# Patient Record
Sex: FEMALE | Race: WHITE | NOT HISPANIC OR LATINO | URBAN - METROPOLITAN AREA
[De-identification: names, ages, dates, MRNs, and addresses within clinical notes are randomized per-mention and may not be internally consistent; named-entity substitution may affect disease eponyms.]

---

## 2018-04-23 ENCOUNTER — APPOINTMENT (OUTPATIENT)
Dept: URBAN - METROPOLITAN AREA CLINIC 219 | Age: 68
Setting detail: DERMATOLOGY
End: 2018-04-23

## 2018-04-23 DIAGNOSIS — L81.4 OTHER MELANIN HYPERPIGMENTATION: ICD-10-CM

## 2018-04-23 DIAGNOSIS — L82.0 INFLAMED SEBORRHEIC KERATOSIS: ICD-10-CM

## 2018-04-23 DIAGNOSIS — L82.1 OTHER SEBORRHEIC KERATOSIS: ICD-10-CM

## 2018-04-23 DIAGNOSIS — L57.0 ACTINIC KERATOSIS: ICD-10-CM

## 2018-04-23 PROBLEM — J30.1 ALLERGIC RHINITIS DUE TO POLLEN: Status: ACTIVE | Noted: 2018-04-23

## 2018-04-23 PROCEDURE — OTHER REASSURANCE: OTHER

## 2018-04-23 PROCEDURE — 17000 DESTRUCT PREMALG LESION: CPT | Mod: 59

## 2018-04-23 PROCEDURE — 99213 OFFICE O/P EST LOW 20 MIN: CPT | Mod: 25

## 2018-04-23 PROCEDURE — 17110 DESTRUCT B9 LESION 1-14: CPT

## 2018-04-23 PROCEDURE — OTHER COUNSELING: OTHER

## 2018-04-23 PROCEDURE — 17003 DESTRUCT PREMALG LES 2-14: CPT

## 2018-04-23 PROCEDURE — OTHER LIQUID NITROGEN: OTHER

## 2018-04-23 ASSESSMENT — LOCATION DETAILED DESCRIPTION DERM
LOCATION DETAILED: LEFT INFERIOR UPPER BACK
LOCATION DETAILED: SUPERIOR THORACIC SPINE
LOCATION DETAILED: LEFT SUPERIOR LATERAL UPPER BACK
LOCATION DETAILED: LEFT INFERIOR MEDIAL MALAR CHEEK
LOCATION DETAILED: LEFT SUPERIOR CENTRAL MALAR CHEEK
LOCATION DETAILED: LEFT ANTERIOR DISTAL UPPER ARM
LOCATION DETAILED: LEFT MEDIAL SUPERIOR CHEST
LOCATION DETAILED: RIGHT CENTRAL MALAR CHEEK
LOCATION DETAILED: LEFT ANTERIOR SHOULDER
LOCATION DETAILED: LEFT SUPERIOR MEDIAL MALAR CHEEK
LOCATION DETAILED: LEFT MID-UPPER BACK

## 2018-04-23 ASSESSMENT — LOCATION ZONE DERM
LOCATION ZONE: FACE
LOCATION ZONE: ARM
LOCATION ZONE: TRUNK

## 2018-04-23 ASSESSMENT — LOCATION SIMPLE DESCRIPTION DERM
LOCATION SIMPLE: LEFT CHEEK
LOCATION SIMPLE: LEFT SHOULDER
LOCATION SIMPLE: RIGHT CHEEK
LOCATION SIMPLE: LEFT UPPER BACK
LOCATION SIMPLE: CHEST
LOCATION SIMPLE: UPPER BACK
LOCATION SIMPLE: LEFT UPPER ARM

## 2018-04-23 NOTE — PROCEDURE: LIQUID NITROGEN
Add 52 Modifier (Optional): no
Detail Level: Detailed
Post-Care Instructions: I reviewed with the patient in detail post-care instructions. Patient is to wear sunprotection, and avoid picking at any of the treated lesions. Pt may apply Vaseline to crusted or scabbing areas.
Duration Of Freeze Thaw-Cycle (Seconds): 0
Number Of Freeze-Thaw Cycles: 1 freeze-thaw cycle
Medical Necessity Clause: This procedure was medically necessary because the lesions that were treated were:
Consent: The patient's consent was obtained including but not limited to risks of crusting, scabbing, blistering, scarring, darker or lighter pigmentary change, recurrence, incomplete removal and infection.
Medical Necessity Information: It is in your best interest to select a reason for this procedure from the list below. All of these items fulfill various CMS LCD requirements except the new and changing color options.

## 2019-11-06 ENCOUNTER — APPOINTMENT (OUTPATIENT)
Dept: URBAN - METROPOLITAN AREA CLINIC 219 | Age: 69
Setting detail: DERMATOLOGY
End: 2019-11-06

## 2019-11-06 DIAGNOSIS — L30.8 OTHER SPECIFIED DERMATITIS: ICD-10-CM

## 2019-11-06 DIAGNOSIS — L82.0 INFLAMED SEBORRHEIC KERATOSIS: ICD-10-CM

## 2019-11-06 DIAGNOSIS — L57.0 ACTINIC KERATOSIS: ICD-10-CM

## 2019-11-06 PROBLEM — L55.1 SUNBURN OF SECOND DEGREE: Status: ACTIVE | Noted: 2019-11-06

## 2019-11-06 PROBLEM — M12.9 ARTHROPATHY, UNSPECIFIED: Status: ACTIVE | Noted: 2019-11-06

## 2019-11-06 PROCEDURE — 17110 DESTRUCT B9 LESION 1-14: CPT

## 2019-11-06 PROCEDURE — 17003 DESTRUCT PREMALG LES 2-14: CPT | Mod: 59

## 2019-11-06 PROCEDURE — 17000 DESTRUCT PREMALG LESION: CPT | Mod: 59

## 2019-11-06 PROCEDURE — OTHER MIPS QUALITY: OTHER

## 2019-11-06 PROCEDURE — OTHER TREATMENT REGIMEN: OTHER

## 2019-11-06 PROCEDURE — OTHER LIQUID NITROGEN: OTHER

## 2019-11-06 PROCEDURE — OTHER PRESCRIPTION: OTHER

## 2019-11-06 PROCEDURE — 99213 OFFICE O/P EST LOW 20 MIN: CPT | Mod: 25

## 2019-11-06 RX ORDER — TRIAMCINOLONE ACETONIDE 1 MG/G
APPLY CREAM TOPICAL AS DIRECTED
Qty: 1 | Refills: 3 | Status: ERX | COMMUNITY
Start: 2019-11-06

## 2019-11-06 ASSESSMENT — LOCATION DETAILED DESCRIPTION DERM
LOCATION DETAILED: LEFT INFERIOR CENTRAL MALAR CHEEK
LOCATION DETAILED: LEFT PROXIMAL DORSAL FOREARM
LOCATION DETAILED: RIGHT PROXIMAL DORSAL FOREARM
LOCATION DETAILED: LEFT LATERAL SUPERIOR CHEST
LOCATION DETAILED: NASAL DORSUM
LOCATION DETAILED: RIGHT ANTERIOR SHOULDER
LOCATION DETAILED: LEFT SUPERIOR LATERAL NECK
LOCATION DETAILED: LEFT RADIAL DORSAL HAND

## 2019-11-06 ASSESSMENT — LOCATION SIMPLE DESCRIPTION DERM
LOCATION SIMPLE: RIGHT SHOULDER
LOCATION SIMPLE: LEFT FOREARM
LOCATION SIMPLE: LEFT CHEEK
LOCATION SIMPLE: NOSE
LOCATION SIMPLE: RIGHT FOREARM
LOCATION SIMPLE: CHEST
LOCATION SIMPLE: LEFT HAND
LOCATION SIMPLE: NECK

## 2019-11-06 ASSESSMENT — LOCATION ZONE DERM
LOCATION ZONE: ARM
LOCATION ZONE: HAND
LOCATION ZONE: FACE
LOCATION ZONE: NOSE
LOCATION ZONE: TRUNK
LOCATION ZONE: NECK

## 2019-11-06 NOTE — HPI: SKIN IRRITATION
Additional History: Patient states “skin grows too fast” and she constantly picks at it. She has been applying OTC cortisone.

## 2019-11-06 NOTE — PROCEDURE: LIQUID NITROGEN
Medical Necessity Information: It is in your best interest to select a reason for this procedure from the list below. All of these items fulfill various CMS LCD requirements except the new and changing color options.
Include Z78.9 (Other Specified Conditions Influencing Health Status) As An Associated Diagnosis?: No
Post-Care Instructions: I reviewed with the patient in detail post-care instructions. Patient is to wear sunprotection, and avoid picking at any of the treated lesions. Pt may apply Vaseline to crusted or scabbing areas.
Duration Of Freeze Thaw-Cycle (Seconds): 0
Detail Level: Detailed
Medical Necessity Clause: This procedure was medically necessary because the lesions that were treated were:
Consent: The patient's consent was obtained including but not limited to risks of crusting, scabbing, blistering, scarring, darker or lighter pigmentary change, recurrence, incomplete removal and infection.

## 2019-11-06 NOTE — PROCEDURE: TREATMENT REGIMEN
Detail Level: Simple
Plan: Cerave moisturizing cream\\nMinimize washing hands \\nTriamcinolone cream apply to affected fingertips twice a day for two weeks, then 2x week as needed

## 2023-06-27 ENCOUNTER — OFFICE VISIT (OUTPATIENT)
Dept: URBAN - NONMETROPOLITAN AREA CLINIC 13 | Facility: CLINIC | Age: 73
End: 2023-06-27
Payer: MEDICARE

## 2023-06-27 ENCOUNTER — LAB OUTSIDE AN ENCOUNTER (OUTPATIENT)
Dept: URBAN - NONMETROPOLITAN AREA CLINIC 13 | Facility: CLINIC | Age: 73
End: 2023-06-27

## 2023-06-27 ENCOUNTER — WEB ENCOUNTER (OUTPATIENT)
Dept: URBAN - NONMETROPOLITAN AREA CLINIC 13 | Facility: CLINIC | Age: 73
End: 2023-06-27

## 2023-06-27 DIAGNOSIS — R11.0 NAUSEA: ICD-10-CM

## 2023-06-27 DIAGNOSIS — R10.11 RUQ PAIN: ICD-10-CM

## 2023-06-27 PROCEDURE — 99204 OFFICE O/P NEW MOD 45 MIN: CPT | Performed by: NURSE PRACTITIONER

## 2023-06-27 RX ORDER — CYANOCOBALAMIN 1000 UG/ML
INJECTION INTRAMUSCULAR; SUBCUTANEOUS
Qty: 3 MILLILITER | Status: ACTIVE | COMMUNITY

## 2023-06-27 RX ORDER — TRAZODONE HYDROCHLORIDE 50 MG/1
TAKE 1 1/2 TABLETS (ONE AND A HALF) BY MOUTH AT BEDTIME TABLET ORAL
Qty: 42 EACH | Refills: 0 | Status: ACTIVE | COMMUNITY

## 2023-06-27 RX ORDER — OMEPRAZOLE 40 MG/1
CAPSULE, DELAYED RELEASE ORAL
Qty: 90 CAPSULE | Status: ACTIVE | COMMUNITY

## 2023-06-27 RX ORDER — TIZANIDINE 4 MG/1
TABLET ORAL
Qty: 84 TABLET | Status: ACTIVE | COMMUNITY

## 2023-06-27 RX ORDER — OMEPRAZOLE 40 MG/1
1 CAPSULE 30 MINUTES BEFORE MORNING MEAL AND SUPPER MEAL CAPSULE, DELAYED RELEASE ORAL TWICE DAILY
Qty: 60 | Refills: 3

## 2023-06-27 RX ORDER — CEFDINIR 300 MG/1
CAPSULE ORAL
Qty: 14 CAPSULE | Status: ACTIVE | COMMUNITY

## 2023-06-27 RX ORDER — LISINOPRIL 40 MG/1
TAKE 1 TABLET BY MOUTH EVERY DAY TABLET ORAL
Qty: 90 EACH | Refills: 1 | Status: ACTIVE | COMMUNITY

## 2023-06-27 RX ORDER — BUSPIRONE HYDROCHLORIDE 5 MG/1
TAKE 1 TABLET BY MOUTH THREE TIMES A DAY IN THE MORNING IN THE EVENING AND BEFORE BEDTIME TABLET ORAL
Qty: 270 EACH | Refills: 0 | Status: ACTIVE | COMMUNITY

## 2023-06-27 RX ORDER — SUCRALFATE 1 G/1
1 TABLET ON AN EMPTY STOMACH TABLET ORAL
Qty: 60 | Refills: 3 | OUTPATIENT
Start: 2023-06-27 | End: 2023-10-25

## 2023-06-27 RX ORDER — CYCLOBENZAPRINE HYDROCHLORIDE 10 MG/1
TABLET, FILM COATED ORAL
Qty: 84 TABLET | Status: ACTIVE | COMMUNITY

## 2023-06-27 RX ORDER — HYDROCODONE BITARTRATE AND ACETAMINOPHEN 10; 325 MG/1; MG/1
TAKE 1 TABLET BY MOUTH FIVE TIME A DAY TABLET ORAL
Qty: 140 EACH | Refills: 0 | Status: ACTIVE | COMMUNITY

## 2023-06-27 RX ORDER — ALBUTEROL SULFATE 108 UG/1
INHALE 2 PUFFS BY MOUTH EVERY 6 HOURS AS NEEDED FOR WHEEZING OR SHORTNESS OF BREATH INHALANT RESPIRATORY (INHALATION)
Qty: 6.7 GRAM | Refills: 6 | Status: ACTIVE | COMMUNITY

## 2023-06-27 RX ORDER — FUROSEMIDE 20 MG/1
TAKE 1 TABLET BY MOUTH TWICE A DAY TABLET ORAL
Qty: 180 EACH | Refills: 1 | Status: ACTIVE | COMMUNITY

## 2023-06-27 RX ORDER — ESTRADIOL 0.5 MG/1
TABLET ORAL
Qty: 90 TABLET | Status: ACTIVE | COMMUNITY

## 2023-06-27 RX ORDER — BUTALBITAL, ACETAMINOPHEN AND CAFFEINE 50; 325; 40 MG/1; MG/1; MG/1
TAKE 1 TABLET BY MOUTH TWICE A DAY TABLET ORAL
Qty: 56 EACH | Refills: 0 | Status: ACTIVE | COMMUNITY

## 2023-06-27 NOTE — HPI-TODAY'S VISIT:
Ms. Maria A Freeman is a 72 year old female who presents today for RUQ pain. Ms. Freeman had RUQ u/s performed on 4/30 that showed no evidence of gallstones but UA was positive for UTI.  CT on 5/22/23 showed hepatic steatosis and diverticulosis.  Liver function normal per labs 5/16.  Patient reported black stool on 5/30. HIDA scan prformed on 6/22 was normal.  HP stool negative. She is currently taking omeprazole 40 mg BID and still describes a severe gnawing pain. Pain is worst after eating. Worsened about 6 weeks ago and she knows she "got an ulcer". Also endorses loss of appetite and nausea. Trying to see a surgeon to discuss CCY.  Last EGD was several years ago.  LG.

## 2023-06-30 ENCOUNTER — TELEPHONE ENCOUNTER (OUTPATIENT)
Dept: URBAN - NONMETROPOLITAN AREA CLINIC 2 | Facility: CLINIC | Age: 73
End: 2023-06-30

## 2023-06-30 ENCOUNTER — OFFICE VISIT (OUTPATIENT)
Dept: URBAN - NONMETROPOLITAN AREA SURGERY CENTER 1 | Facility: SURGERY CENTER | Age: 73
End: 2023-06-30
Payer: MEDICARE

## 2023-06-30 DIAGNOSIS — K31.A11 GASTRIC INTESTINAL METAPLASIA WITHOUT DYSPLASIA, INVOLVING THE ANTRUM: ICD-10-CM

## 2023-06-30 DIAGNOSIS — K29.40 ATROPHIC GASTRITIS: ICD-10-CM

## 2023-06-30 PROCEDURE — G8907 PT DOC NO EVENTS ON DISCHARG: HCPCS | Performed by: INTERNAL MEDICINE

## 2023-06-30 PROCEDURE — 43239 EGD BIOPSY SINGLE/MULTIPLE: CPT | Performed by: INTERNAL MEDICINE

## 2023-06-30 RX ORDER — LISINOPRIL 40 MG/1
TAKE 1 TABLET BY MOUTH EVERY DAY TABLET ORAL
Qty: 90 EACH | Refills: 1 | Status: ACTIVE | COMMUNITY

## 2023-06-30 RX ORDER — CYANOCOBALAMIN 1000 UG/ML
INJECTION INTRAMUSCULAR; SUBCUTANEOUS
Qty: 3 MILLILITER | Status: ACTIVE | COMMUNITY

## 2023-06-30 RX ORDER — CYCLOBENZAPRINE HYDROCHLORIDE 10 MG/1
TABLET, FILM COATED ORAL
Qty: 84 TABLET | Status: ACTIVE | COMMUNITY

## 2023-06-30 RX ORDER — ALBUTEROL SULFATE 108 UG/1
INHALE 2 PUFFS BY MOUTH EVERY 6 HOURS AS NEEDED FOR WHEEZING OR SHORTNESS OF BREATH INHALANT RESPIRATORY (INHALATION)
Qty: 6.7 GRAM | Refills: 6 | Status: ACTIVE | COMMUNITY

## 2023-06-30 RX ORDER — SUCRALFATE 1 G/1
1 TABLET ON AN EMPTY STOMACH TABLET ORAL
Qty: 60 | Refills: 3 | Status: ACTIVE | COMMUNITY
Start: 2023-06-27 | End: 2023-10-25

## 2023-06-30 RX ORDER — TRAZODONE HYDROCHLORIDE 50 MG/1
TAKE 1 1/2 TABLETS (ONE AND A HALF) BY MOUTH AT BEDTIME TABLET ORAL
Qty: 42 EACH | Refills: 0 | Status: ACTIVE | COMMUNITY

## 2023-06-30 RX ORDER — OMEPRAZOLE 40 MG/1
1 CAPSULE 30 MINUTES BEFORE MORNING MEAL AND SUPPER MEAL CAPSULE, DELAYED RELEASE ORAL TWICE DAILY
Qty: 60 | Refills: 3 | Status: ACTIVE | COMMUNITY

## 2023-06-30 RX ORDER — TIZANIDINE 4 MG/1
TABLET ORAL
Qty: 84 TABLET | Status: ACTIVE | COMMUNITY

## 2023-06-30 RX ORDER — BUTALBITAL, ACETAMINOPHEN AND CAFFEINE 50; 325; 40 MG/1; MG/1; MG/1
TAKE 1 TABLET BY MOUTH TWICE A DAY TABLET ORAL
Qty: 56 EACH | Refills: 0 | Status: ACTIVE | COMMUNITY

## 2023-06-30 RX ORDER — HYDROCODONE BITARTRATE AND ACETAMINOPHEN 10; 325 MG/1; MG/1
TAKE 1 TABLET BY MOUTH FIVE TIME A DAY TABLET ORAL
Qty: 140 EACH | Refills: 0 | Status: ACTIVE | COMMUNITY

## 2023-06-30 RX ORDER — FUROSEMIDE 20 MG/1
TAKE 1 TABLET BY MOUTH TWICE A DAY TABLET ORAL
Qty: 180 EACH | Refills: 1 | Status: ACTIVE | COMMUNITY

## 2023-06-30 RX ORDER — ESTRADIOL 0.5 MG/1
TABLET ORAL
Qty: 90 TABLET | Status: ACTIVE | COMMUNITY

## 2023-06-30 RX ORDER — CEFDINIR 300 MG/1
CAPSULE ORAL
Qty: 14 CAPSULE | Status: ACTIVE | COMMUNITY

## 2023-06-30 RX ORDER — BUSPIRONE HYDROCHLORIDE 5 MG/1
TAKE 1 TABLET BY MOUTH THREE TIMES A DAY IN THE MORNING IN THE EVENING AND BEFORE BEDTIME TABLET ORAL
Qty: 270 EACH | Refills: 0 | Status: ACTIVE | COMMUNITY

## 2023-07-19 ENCOUNTER — ERX REFILL RESPONSE (OUTPATIENT)
Dept: URBAN - NONMETROPOLITAN AREA CLINIC 13 | Facility: CLINIC | Age: 73
End: 2023-07-19

## 2023-07-19 RX ORDER — OMEPRAZOLE 40 MG/1
TAKE 1 CAPSULE 30 MINUTES BEFORE MORNING MEAL AND SUPPER MEAL ORAL TWICE DAILY 30 DAYS CAPSULE, DELAYED RELEASE ORAL
Qty: 60 CAPSULE | Refills: 3 | OUTPATIENT

## 2023-07-19 RX ORDER — OMEPRAZOLE 40 MG/1
1 CAPSULE 30 MINUTES BEFORE MORNING MEAL AND SUPPER MEAL CAPSULE, DELAYED RELEASE ORAL TWICE DAILY
Qty: 60 | Refills: 3 | OUTPATIENT

## 2023-07-26 ENCOUNTER — ERX REFILL RESPONSE (OUTPATIENT)
Dept: URBAN - NONMETROPOLITAN AREA CLINIC 13 | Facility: CLINIC | Age: 73
End: 2023-07-26

## 2023-07-26 RX ORDER — SUCRALFATE 1 G/1
TAKE 1 TABLET BY MOUTH TWICE A DAY BEFORE LUNCH AND BEFORE BEDTIME FOR 30 DAYS TABLET ORAL
Qty: 60 TABLET | Refills: 3 | OUTPATIENT

## 2023-07-26 RX ORDER — SUCRALFATE 1 G/1
1 TABLET ON AN EMPTY STOMACH TABLET ORAL
Qty: 60 | Refills: 3 | OUTPATIENT

## 2023-10-10 ENCOUNTER — OFFICE VISIT (OUTPATIENT)
Dept: URBAN - NONMETROPOLITAN AREA CLINIC 13 | Facility: CLINIC | Age: 73
End: 2023-10-10
Payer: MEDICARE

## 2023-10-10 VITALS
BODY MASS INDEX: 20.25 KG/M2 | HEART RATE: 88 BPM | HEIGHT: 67 IN | WEIGHT: 129 LBS | SYSTOLIC BLOOD PRESSURE: 158 MMHG | DIASTOLIC BLOOD PRESSURE: 71 MMHG

## 2023-10-10 DIAGNOSIS — R10.13 EPIGASTRIC ABDOMINAL PAIN: ICD-10-CM

## 2023-10-10 DIAGNOSIS — R11.0 CHRONIC NAUSEA: ICD-10-CM

## 2023-10-10 DIAGNOSIS — R10.11 RUQ PAIN: ICD-10-CM

## 2023-10-10 PROCEDURE — 99213 OFFICE O/P EST LOW 20 MIN: CPT | Performed by: NURSE PRACTITIONER

## 2023-10-10 RX ORDER — OMEPRAZOLE 40 MG/1
TAKE 1 CAPSULE 30 MINUTES BEFORE MORNING MEAL AND SUPPER MEAL ORAL TWICE DAILY 30 DAYS CAPSULE, DELAYED RELEASE ORAL
Qty: 60 CAPSULE | Refills: 3 | Status: ACTIVE | COMMUNITY

## 2023-10-10 RX ORDER — SUCRALFATE 1 G/1
TAKE 1 TABLET BY MOUTH TWICE A DAY BEFORE LUNCH AND BEFORE BEDTIME FOR 30 DAYS TABLET ORAL
Qty: 60 TABLET | Refills: 3 | Status: ACTIVE | COMMUNITY

## 2023-10-10 RX ORDER — CEFDINIR 300 MG/1
CAPSULE ORAL
Qty: 14 CAPSULE | Status: ACTIVE | COMMUNITY

## 2023-10-10 RX ORDER — ESTRADIOL 0.5 MG/1
TABLET ORAL
Qty: 90 TABLET | Status: ACTIVE | COMMUNITY

## 2023-10-10 RX ORDER — LISINOPRIL 40 MG/1
TAKE 1 TABLET BY MOUTH EVERY DAY TABLET ORAL
Qty: 90 EACH | Refills: 1 | Status: ACTIVE | COMMUNITY

## 2023-10-10 RX ORDER — ONDANSETRON 4 MG/1
1 TABLET ON THE TONGUE AND ALLOW TO DISSOLVE TABLET, ORALLY DISINTEGRATING ORAL ONCE A DAY
Qty: 30 | OUTPATIENT
Start: 2023-10-10

## 2023-10-10 RX ORDER — TIZANIDINE 4 MG/1
TABLET ORAL
Qty: 84 TABLET | Status: ACTIVE | COMMUNITY

## 2023-10-10 RX ORDER — FAMOTIDINE 40 MG/1
1 TABLET AT BEDTIME TABLET, FILM COATED ORAL ONCE A DAY
Qty: 30 | Refills: 5 | OUTPATIENT
Start: 2023-10-10

## 2023-10-10 RX ORDER — ALBUTEROL SULFATE 108 UG/1
INHALE 2 PUFFS BY MOUTH EVERY 6 HOURS AS NEEDED FOR WHEEZING OR SHORTNESS OF BREATH INHALANT RESPIRATORY (INHALATION)
Qty: 6.7 GRAM | Refills: 6 | Status: ACTIVE | COMMUNITY

## 2023-10-10 RX ORDER — HYDROCODONE BITARTRATE AND ACETAMINOPHEN 10; 325 MG/1; MG/1
TAKE 1 TABLET BY MOUTH FIVE TIME A DAY TABLET ORAL
Qty: 140 EACH | Refills: 0 | Status: ACTIVE | COMMUNITY

## 2023-10-10 RX ORDER — FUROSEMIDE 20 MG/1
TAKE 1 TABLET BY MOUTH TWICE A DAY TABLET ORAL
Qty: 180 EACH | Refills: 1 | Status: ACTIVE | COMMUNITY

## 2023-10-10 RX ORDER — TRAZODONE HYDROCHLORIDE 50 MG/1
TAKE 1 1/2 TABLETS (ONE AND A HALF) BY MOUTH AT BEDTIME TABLET ORAL
Qty: 42 EACH | Refills: 0 | Status: ACTIVE | COMMUNITY

## 2023-10-10 RX ORDER — OMEPRAZOLE 40 MG/1
1 CAPSULE 30 MINUTES BEFORE MORNING MEAL CAPSULE, DELAYED RELEASE ORAL ONCE A DAY
Qty: 90 | Refills: 1

## 2023-10-10 RX ORDER — BUSPIRONE HYDROCHLORIDE 5 MG/1
TAKE 1 TABLET BY MOUTH THREE TIMES A DAY IN THE MORNING IN THE EVENING AND BEFORE BEDTIME TABLET ORAL
Qty: 270 EACH | Refills: 0 | Status: ACTIVE | COMMUNITY

## 2023-10-10 RX ORDER — SUCRALFATE 1 G/1
1 TABLET ON AN EMPTY STOMACH TABLET ORAL
Qty: 60 | Refills: 3 | OUTPATIENT

## 2023-10-10 RX ORDER — CYCLOBENZAPRINE HYDROCHLORIDE 10 MG/1
TABLET, FILM COATED ORAL
Qty: 84 TABLET | Status: ACTIVE | COMMUNITY

## 2023-10-10 RX ORDER — CYANOCOBALAMIN 1000 UG/ML
INJECTION INTRAMUSCULAR; SUBCUTANEOUS
Qty: 3 MILLILITER | Status: ACTIVE | COMMUNITY

## 2023-10-10 RX ORDER — BUTALBITAL, ACETAMINOPHEN AND CAFFEINE 50; 325; 40 MG/1; MG/1; MG/1
TAKE 1 TABLET BY MOUTH TWICE A DAY TABLET ORAL
Qty: 56 EACH | Refills: 0 | Status: ACTIVE | COMMUNITY

## 2023-10-10 NOTE — HPI-TODAY'S VISIT:
Ms. Maria A Freeman is a 73-year-old female who presents today for follow-up of right upper quadrant pain.  Since her last visit she had an upper endoscopy that showed chronic gastritis and a large amount of retained food in stomach.  Gastric emptying study was obtained subsequently with normal results.  Today Maria A reports that Carafate helped significantly with her stomach discomfort but she still experiences "aggravating "epigastric pain after she eats gluten.  She has been trying to follow a gluten-free diet which she also feels is helping her symptoms.  Her stool has returned to brown color and she has not experienced any melena nor black or gray stools since her last visit.  She endorses "slight "nausea in the mornings.  She is considering seeing a surgeon to discuss CCY but would like to avoid surgery if possible.  We discussed weaning her omeprazole from twice daily to once daily dosing and adding famotidine at night which will hopefully help her nausea.  I will also prescribe Zofran to be used on an as-needed basis.  LG.

## 2023-10-10 NOTE — HPI-OTHER HISTORIES
6/27/23:  Ms. Maria A Freeman is a 72 year old female who presents today for RUQ pain. Ms. Freeman had RUQ u/s performed on 4/30 that showed no evidence of gallstones but UA was positive for UTI. CT on 5/22/23 showed hepatic steatosis and diverticulosis. Liver function normal per labs 5/16. Patient reported black stool on 5/30. HIDA scan prformed on 6/22 was normal. HP stool negative. She is currently taking omeprazole 40 mg BID and still describes a severe gnawing pain. Pain is worst after eating. Worsened about 6 weeks ago and she knows she "got an ulcer". Also endorses loss of appetite and nausea. Trying to see a surgeon to discuss CCY. Last EGD was several years ago. LG.  EGD 6/30/2023 - chronic gastritis, large amout of retained food in stomach  GES 8/1/2023 - normal gastric emptying

## 2023-11-10 ENCOUNTER — ERX REFILL RESPONSE (OUTPATIENT)
Dept: URBAN - NONMETROPOLITAN AREA CLINIC 13 | Facility: CLINIC | Age: 73
End: 2023-11-10

## 2023-11-10 RX ORDER — FAMOTIDINE 40 MG/1
1 TABLET AT BEDTIME TABLET, FILM COATED ORAL ONCE A DAY
Qty: 30 | Refills: 5 | OUTPATIENT

## 2023-11-10 RX ORDER — FAMOTIDINE 40 MG/1
TAKE 1 TABLET BY MOUTH EVERY DAY AT BEDTIME FOR 30 DAYS TABLET, FILM COATED ORAL
Qty: 90 TABLET | Refills: 2 | OUTPATIENT

## 2023-12-19 ENCOUNTER — CLAIMS CREATED FROM THE CLAIM WINDOW (OUTPATIENT)
Dept: URBAN - NONMETROPOLITAN AREA CLINIC 13 | Facility: CLINIC | Age: 73
End: 2023-12-19
Payer: MEDICARE

## 2023-12-19 ENCOUNTER — LAB OUTSIDE AN ENCOUNTER (OUTPATIENT)
Dept: URBAN - NONMETROPOLITAN AREA CLINIC 2 | Facility: CLINIC | Age: 73
End: 2023-12-19

## 2023-12-19 VITALS
SYSTOLIC BLOOD PRESSURE: 157 MMHG | DIASTOLIC BLOOD PRESSURE: 73 MMHG | WEIGHT: 126 LBS | BODY MASS INDEX: 19.78 KG/M2 | HEIGHT: 67 IN | HEART RATE: 73 BPM

## 2023-12-19 DIAGNOSIS — R11.0 NAUSEA: ICD-10-CM

## 2023-12-19 DIAGNOSIS — R10.13 EPIGASTRIC ABDOMINAL PAIN: ICD-10-CM

## 2023-12-19 DIAGNOSIS — R11.0 CHRONIC NAUSEA: ICD-10-CM

## 2023-12-19 DIAGNOSIS — R10.11 RUQ PAIN: ICD-10-CM

## 2023-12-19 DIAGNOSIS — R53.83 FATIGUE, UNSPECIFIED TYPE: ICD-10-CM

## 2023-12-19 PROBLEM — 301717006: Status: ACTIVE | Noted: 2023-06-27

## 2023-12-19 PROBLEM — 422587007: Status: ACTIVE | Noted: 2023-06-27

## 2023-12-19 LAB
A/G RATIO: 1.5
ALBUMIN: 4.3
ALKALINE PHOSPHATASE: 78
ALT (SGPT): 13
ANION GAP: 15
AST (SGOT): 17
BASOPHILS AUTOMATED ABSOLUTE COUNT: 0.1
BASOPHILS RELATIVE PERCENT: 1.6
BILIRUBIN TOTAL: 0.3
BLOOD UREA NITROGEN: 12
BUN / CREAT RATIO: 14
CALCIUM: 9.4
CHLORIDE: 102
CO2: 27
CREATININE, SERUM: 0.84
EGFR (CKD-EPI): >60
EOSINOPHILS AUTOMATED ABSOLUTE COUNT: 0.1
EOSINOPHILS RELATIVE PERCENT: 1.4
GLUCOSE: 92
HEMATOCRIT: 44
HEMOGLOBIN: 15.2
IRON SATURATION: 33
IRON: 116
LYMPHOCYTES AUTOMATED ABSOLUTE COUNT: 2.3
LYMPHOCYTES RELATIVE PERCENT: 44
MEAN CORPUSCULAR HEMOGLOBIN CONC: 34.6
MEAN CORPUSCULAR HEMOGLOBIN: 35
MEAN CORPUSCULAR VOLUME: 101.2
MEAN PLATELET VOLUME: 8.2
MONOCYTES AUTOMATED ABSOLUTE COUNT: 0.5
MONOCYTES RELATIVE PERCENT: 9.5
NEUTROPHILS AUTOMATED ABSOLUTE: 2.3
NEUTROPHILS RELATIVE PERCENT: 43.5
PLATELET COUNT: 119
POTASSIUM: 3.7
PROTEIN TOTAL: 7.2
RED BLOOD CELL COUNT: 4.35
RED CELL DISTRIBUTION WIDTH: 12.2
SODIUM: 140
TOTAL IRON BINDING CAPACITY: 356
TSH: 1.59
UNSATURATED IRON BINDING CAPACITY: 240
VITAMIN B-12: 790
WHITE BLOOD CELL COUNT: 5.3

## 2023-12-19 PROCEDURE — 99214 OFFICE O/P EST MOD 30 MIN: CPT | Performed by: NURSE PRACTITIONER

## 2023-12-19 RX ORDER — FUROSEMIDE 20 MG/1
TAKE 1 TABLET BY MOUTH TWICE A DAY TABLET ORAL
Qty: 180 EACH | Refills: 1 | Status: ACTIVE | COMMUNITY

## 2023-12-19 RX ORDER — BUTALBITAL, ACETAMINOPHEN AND CAFFEINE 50; 325; 40 MG/1; MG/1; MG/1
TAKE 1 TABLET BY MOUTH TWICE A DAY TABLET ORAL
Qty: 56 EACH | Refills: 0 | Status: ACTIVE | COMMUNITY

## 2023-12-19 RX ORDER — OMEPRAZOLE 40 MG/1
1 CAPSULE 30 MINUTES BEFORE MORNING MEAL CAPSULE, DELAYED RELEASE ORAL ONCE A DAY
Qty: 90 | Refills: 1 | Status: ACTIVE | COMMUNITY

## 2023-12-19 RX ORDER — FAMOTIDINE 40 MG/1
TAKE 1 TABLET BY MOUTH EVERY DAY AT BEDTIME FOR 30 DAYS TABLET, FILM COATED ORAL
Qty: 90 TABLET | Refills: 2 | Status: ACTIVE | COMMUNITY

## 2023-12-19 RX ORDER — LISINOPRIL 40 MG/1
TAKE 1 TABLET BY MOUTH EVERY DAY TABLET ORAL
Qty: 90 EACH | Refills: 1 | Status: ACTIVE | COMMUNITY

## 2023-12-19 RX ORDER — OMEPRAZOLE 40 MG/1
TAKE 1 CAPSULE 30 MINUTES BEFORE MORNING MEAL AND SUPPER MEAL ORAL TWICE DAILY 30 DAYS CAPSULE, DELAYED RELEASE ORAL
Qty: 60 CAPSULE | Refills: 3 | Status: ACTIVE | COMMUNITY

## 2023-12-19 RX ORDER — HYOSCYAMINE SULFATE 0.12 MG/1
1 TABLET UNDER THE TONGUE AND ALLOW TO DISSOLVE EVERY 4-6 HRS AS NEEDED FOR ABDOMINAL PAIN/SPASM TABLET, ORALLY DISINTEGRATING ORAL
Qty: 90 | Refills: 3 | OUTPATIENT
Start: 2023-12-19 | End: 2024-04-17

## 2023-12-19 RX ORDER — CYCLOBENZAPRINE HYDROCHLORIDE 10 MG/1
TABLET, FILM COATED ORAL
Qty: 84 TABLET | Status: ACTIVE | COMMUNITY

## 2023-12-19 RX ORDER — TRAZODONE HYDROCHLORIDE 50 MG/1
TAKE 1 1/2 TABLETS (ONE AND A HALF) BY MOUTH AT BEDTIME TABLET ORAL
Qty: 42 EACH | Refills: 0 | Status: ACTIVE | COMMUNITY

## 2023-12-19 RX ORDER — SUCRALFATE 1 G/1
1 TABLET ON AN EMPTY STOMACH TABLET ORAL
Qty: 60 | Refills: 3 | Status: ACTIVE | COMMUNITY

## 2023-12-19 RX ORDER — FAMOTIDINE 40 MG/1
1 TABLET AT BEDTIME TABLET, FILM COATED ORAL ONCE A DAY
Qty: 30 | Refills: 5 | OUTPATIENT

## 2023-12-19 RX ORDER — CEFDINIR 300 MG/1
CAPSULE ORAL
Qty: 14 CAPSULE | Status: ACTIVE | COMMUNITY

## 2023-12-19 RX ORDER — TIZANIDINE 4 MG/1
TABLET ORAL
Qty: 84 TABLET | Status: ACTIVE | COMMUNITY

## 2023-12-19 RX ORDER — SUCRALFATE 1 G/1
1 TABLET ON AN EMPTY STOMACH TABLET ORAL
Qty: 60 | Refills: 3 | OUTPATIENT

## 2023-12-19 RX ORDER — ESTRADIOL 0.5 MG/1
TABLET ORAL
Qty: 90 TABLET | Status: ACTIVE | COMMUNITY

## 2023-12-19 RX ORDER — OMEPRAZOLE 40 MG/1
1 CAPSULE 30 MINUTES BEFORE MORNING MEAL CAPSULE, DELAYED RELEASE ORAL ONCE A DAY
Qty: 90 | Refills: 1

## 2023-12-19 RX ORDER — SUCRALFATE 1 G/1
TAKE 1 TABLET BY MOUTH TWICE A DAY BEFORE LUNCH AND BEFORE BEDTIME FOR 30 DAYS TABLET ORAL
Qty: 60 TABLET | Refills: 3 | Status: ACTIVE | COMMUNITY

## 2023-12-19 RX ORDER — ALBUTEROL SULFATE 108 UG/1
INHALE 2 PUFFS BY MOUTH EVERY 6 HOURS AS NEEDED FOR WHEEZING OR SHORTNESS OF BREATH INHALANT RESPIRATORY (INHALATION)
Qty: 6.7 GRAM | Refills: 6 | Status: ACTIVE | COMMUNITY

## 2023-12-19 RX ORDER — HYDROCODONE BITARTRATE AND ACETAMINOPHEN 10; 325 MG/1; MG/1
TAKE 1 TABLET BY MOUTH FIVE TIME A DAY TABLET ORAL
Qty: 140 EACH | Refills: 0 | Status: ACTIVE | COMMUNITY

## 2023-12-19 RX ORDER — ONDANSETRON 4 MG/1
1 TABLET ON THE TONGUE AND ALLOW TO DISSOLVE TABLET, ORALLY DISINTEGRATING ORAL ONCE A DAY
Qty: 30 | OUTPATIENT

## 2023-12-19 RX ORDER — BUSPIRONE HYDROCHLORIDE 5 MG/1
TAKE 1 TABLET BY MOUTH THREE TIMES A DAY IN THE MORNING IN THE EVENING AND BEFORE BEDTIME TABLET ORAL
Qty: 270 EACH | Refills: 0 | Status: ACTIVE | COMMUNITY

## 2023-12-19 RX ORDER — ONDANSETRON 4 MG/1
1 TABLET ON THE TONGUE AND ALLOW TO DISSOLVE TABLET, ORALLY DISINTEGRATING ORAL ONCE A DAY
Qty: 30 | Status: ACTIVE | COMMUNITY
Start: 2023-10-10

## 2023-12-19 RX ORDER — CYANOCOBALAMIN 1000 UG/ML
INJECTION INTRAMUSCULAR; SUBCUTANEOUS
Qty: 3 MILLILITER | Status: ACTIVE | COMMUNITY

## 2023-12-19 NOTE — HPI-TODAY'S VISIT:
12/19/2023 Ms. Maria A Freeman is here for follow-up of right upper quadrant pain.  EGD showed chronic gastritis and a large amount of retained food in stomach.  Gastric emptying study was normal. At her last OV, carafate and gluten free diet helped significantly. Her omeprazole was dropped to once a day. She denies any worsening symptoms. She feels like her symptoms continue to improve. She does still have some gnawing stomach pain after eating. She takes CBD oil and it does help. Overall, she feels like things are getting better. CS

## 2023-12-19 NOTE — HPI-OTHER HISTORIES
6/27/23:  Ms. Maria A Freeman is a 72 year old female who presents today for RUQ pain. Ms. Freeman had RUQ u/s performed on 4/30 that showed no evidence of gallstones but UA was positive for UTI. CT on 5/22/23 showed hepatic steatosis and diverticulosis. Liver function normal per labs 5/16. Patient reported black stool on 5/30. HIDA scan prformed on 6/22 was normal. HP stool negative. She is currently taking omeprazole 40 mg BID and still describes a severe gnawing pain. Pain is worst after eating. Worsened about 6 weeks ago and she knows she "got an ulcer". Also endorses loss of appetite and nausea. Trying to see a surgeon to discuss CCY. Last EGD was several years ago. LG.  EGD 6/30/2023 - chronic gastritis, large amout of retained food in stomach  GES 8/1/2023 - normal gastric emptying  10/10/2023 Ms. Maria A rFeeman is a 73-year-old female who presents today for follow-up of right upper quadrant pain. Since her last visit she had an upper endoscopy that showed chronic gastritis and a large amount of retained food in stomach. Gastric emptying study was obtained subsequently with normal results. Today Maria A reports that Carafate helped significantly with her stomach discomfort but she still experiences "aggravating "epigastric pain after she eats gluten. She has been trying to follow a gluten-free diet which she also feels is helping her symptoms. Her stool has returned to brown color and she has not experienced any melena nor black or gray stools since her last visit. She endorses "slight "nausea in the mornings. She is considering seeing a surgeon to discuss CCY but would like to avoid surgery if possible. We discussed weaning her omeprazole from twice daily to once daily dosing and adding famotidine at night which will hopefully help her nausea. I will also prescribe Zofran to be used on an as-needed basis. LG.

## 2024-01-09 ENCOUNTER — OFFICE VISIT (OUTPATIENT)
Dept: URBAN - NONMETROPOLITAN AREA CLINIC 13 | Facility: CLINIC | Age: 74
End: 2024-01-09

## 2024-01-30 ENCOUNTER — OFFICE VISIT (OUTPATIENT)
Dept: URBAN - NONMETROPOLITAN AREA CLINIC 13 | Facility: CLINIC | Age: 74
End: 2024-01-30

## 2024-01-30 RX ORDER — SUCRALFATE 1 G/1
1 TABLET ON AN EMPTY STOMACH TABLET ORAL
Qty: 60 | Refills: 3 | Status: ACTIVE | COMMUNITY

## 2024-01-30 RX ORDER — HYOSCYAMINE SULFATE 0.12 MG/1
1 TABLET UNDER THE TONGUE AND ALLOW TO DISSOLVE EVERY 4-6 HRS AS NEEDED FOR ABDOMINAL PAIN/SPASM TABLET, ORALLY DISINTEGRATING ORAL
Qty: 90 | Refills: 3 | OUTPATIENT

## 2024-01-30 RX ORDER — ONDANSETRON 4 MG/1
1 TABLET ON THE TONGUE AND ALLOW TO DISSOLVE TABLET, ORALLY DISINTEGRATING ORAL ONCE A DAY
Qty: 30 | Status: ACTIVE | COMMUNITY

## 2024-01-30 RX ORDER — FAMOTIDINE 40 MG/1
TAKE 1 TABLET BY MOUTH EVERY DAY AT BEDTIME FOR 30 DAYS TABLET, FILM COATED ORAL
Qty: 90 TABLET | Refills: 2 | Status: ACTIVE | COMMUNITY

## 2024-01-30 RX ORDER — OMEPRAZOLE 40 MG/1
1 CAPSULE 30 MINUTES BEFORE MORNING MEAL CAPSULE, DELAYED RELEASE ORAL ONCE A DAY
Qty: 90 | Refills: 1

## 2024-01-30 RX ORDER — FAMOTIDINE 40 MG/1
1 TABLET AT BEDTIME TABLET, FILM COATED ORAL ONCE A DAY
Qty: 30 | Refills: 5 | Status: ACTIVE | COMMUNITY

## 2024-01-30 RX ORDER — OMEPRAZOLE 40 MG/1
1 CAPSULE 30 MINUTES BEFORE MORNING MEAL CAPSULE, DELAYED RELEASE ORAL ONCE A DAY
Qty: 90 | Refills: 1 | Status: ACTIVE | COMMUNITY

## 2024-01-30 RX ORDER — CEFDINIR 300 MG/1
CAPSULE ORAL
Qty: 14 CAPSULE | Status: ACTIVE | COMMUNITY

## 2024-01-30 RX ORDER — CYANOCOBALAMIN 1000 UG/ML
INJECTION INTRAMUSCULAR; SUBCUTANEOUS
Qty: 3 MILLILITER | Status: ACTIVE | COMMUNITY

## 2024-01-30 RX ORDER — SUCRALFATE 1 G/1
TAKE 1 TABLET BY MOUTH TWICE A DAY BEFORE LUNCH AND BEFORE BEDTIME FOR 30 DAYS TABLET ORAL
Qty: 60 TABLET | Refills: 3 | Status: ACTIVE | COMMUNITY

## 2024-01-30 RX ORDER — SUCRALFATE 1 G/1
1 TABLET ON AN EMPTY STOMACH TABLET ORAL
Qty: 60 | Refills: 3 | OUTPATIENT

## 2024-01-30 RX ORDER — BUTALBITAL, ACETAMINOPHEN AND CAFFEINE 50; 325; 40 MG/1; MG/1; MG/1
TAKE 1 TABLET BY MOUTH TWICE A DAY TABLET ORAL
Qty: 56 EACH | Refills: 0 | Status: ACTIVE | COMMUNITY

## 2024-01-30 RX ORDER — LISINOPRIL 40 MG/1
TAKE 1 TABLET BY MOUTH EVERY DAY TABLET ORAL
Qty: 90 EACH | Refills: 1 | Status: ACTIVE | COMMUNITY

## 2024-01-30 RX ORDER — OMEPRAZOLE 40 MG/1
TAKE 1 CAPSULE 30 MINUTES BEFORE MORNING MEAL AND SUPPER MEAL ORAL TWICE DAILY 30 DAYS CAPSULE, DELAYED RELEASE ORAL
Qty: 60 CAPSULE | Refills: 3 | Status: ACTIVE | COMMUNITY

## 2024-01-30 RX ORDER — HYOSCYAMINE SULFATE 0.12 MG/1
1 TABLET UNDER THE TONGUE AND ALLOW TO DISSOLVE EVERY 4-6 HRS AS NEEDED FOR ABDOMINAL PAIN/SPASM TABLET, ORALLY DISINTEGRATING ORAL
Qty: 90 | Refills: 3 | Status: ACTIVE | COMMUNITY
Start: 2023-12-19 | End: 2024-04-17

## 2024-01-30 RX ORDER — FAMOTIDINE 40 MG/1
1 TABLET AT BEDTIME TABLET, FILM COATED ORAL ONCE A DAY
Qty: 30 | Refills: 5 | OUTPATIENT

## 2024-01-30 RX ORDER — TRAZODONE HYDROCHLORIDE 50 MG/1
TAKE 1 1/2 TABLETS (ONE AND A HALF) BY MOUTH AT BEDTIME TABLET ORAL
Qty: 42 EACH | Refills: 0 | Status: ACTIVE | COMMUNITY

## 2024-01-30 RX ORDER — ONDANSETRON 4 MG/1
1 TABLET ON THE TONGUE AND ALLOW TO DISSOLVE TABLET, ORALLY DISINTEGRATING ORAL ONCE A DAY
Qty: 30 | OUTPATIENT

## 2024-01-30 RX ORDER — TIZANIDINE 4 MG/1
TABLET ORAL
Qty: 84 TABLET | Status: ACTIVE | COMMUNITY

## 2024-01-30 RX ORDER — ALBUTEROL SULFATE 108 UG/1
INHALE 2 PUFFS BY MOUTH EVERY 6 HOURS AS NEEDED FOR WHEEZING OR SHORTNESS OF BREATH INHALANT RESPIRATORY (INHALATION)
Qty: 6.7 GRAM | Refills: 6 | Status: ACTIVE | COMMUNITY

## 2024-01-30 RX ORDER — BUSPIRONE HYDROCHLORIDE 5 MG/1
TAKE 1 TABLET BY MOUTH THREE TIMES A DAY IN THE MORNING IN THE EVENING AND BEFORE BEDTIME TABLET ORAL
Qty: 270 EACH | Refills: 0 | Status: ACTIVE | COMMUNITY

## 2024-01-30 RX ORDER — ESTRADIOL 0.5 MG/1
TABLET ORAL
Qty: 90 TABLET | Status: ACTIVE | COMMUNITY

## 2024-01-30 RX ORDER — FUROSEMIDE 20 MG/1
TAKE 1 TABLET BY MOUTH TWICE A DAY TABLET ORAL
Qty: 180 EACH | Refills: 1 | Status: ACTIVE | COMMUNITY

## 2024-01-30 RX ORDER — CYCLOBENZAPRINE HYDROCHLORIDE 10 MG/1
TABLET, FILM COATED ORAL
Qty: 84 TABLET | Status: ACTIVE | COMMUNITY

## 2024-01-30 RX ORDER — HYDROCODONE BITARTRATE AND ACETAMINOPHEN 10; 325 MG/1; MG/1
TAKE 1 TABLET BY MOUTH FIVE TIME A DAY TABLET ORAL
Qty: 140 EACH | Refills: 0 | Status: ACTIVE | COMMUNITY

## 2024-01-30 NOTE — HPI-OTHER HISTORIES
6/27/23:  Ms. Maria A Freeman is a 72 year old female who presents today for RUQ pain. Ms. Freeman had RUQ u/s performed on 4/30 that showed no evidence of gallstones but UA was positive for UTI. CT on 5/22/23 showed hepatic steatosis and diverticulosis. Liver function normal per labs 5/16. Patient reported black stool on 5/30. HIDA scan prformed on 6/22 was normal. HP stool negative. She is currently taking omeprazole 40 mg BID and still describes a severe gnawing pain. Pain is worst after eating. Worsened about 6 weeks ago and she knows she "got an ulcer". Also endorses loss of appetite and nausea. Trying to see a surgeon to discuss CCY. Last EGD was several years ago. LG.  EGD 6/30/2023 - chronic gastritis, large amout of retained food in stomach  GES 8/1/2023 - normal gastric emptying  10/10/2023 Ms. Maria A Freeman is a 73-year-old female who presents today for follow-up of right upper quadrant pain. Since her last visit she had an upper endoscopy that showed chronic gastritis and a large amount of retained food in stomach. Gastric emptying study was obtained subsequently with normal results. Today Maria A reports that Carafate helped significantly with her stomach discomfort but she still experiences "aggravating "epigastric pain after she eats gluten. She has been trying to follow a gluten-free diet which she also feels is helping her symptoms. Her stool has returned to brown color and she has not experienced any melena nor black or gray stools since her last visit. She endorses "slight "nausea in the mornings. She is considering seeing a surgeon to discuss CCY but would like to avoid surgery if possible. We discussed weaning her omeprazole from twice daily to once daily dosing and adding famotidine at night which will hopefully help her nausea. I will also prescribe Zofran to be used on an as-needed basis. LG.  12/19/2023: 12/19/2023 Ms. Maria A Freeman is here for follow-up of right upper quadrant pain. EGD showed chronic gastritis and a large amount of retained food in stomach. Gastric emptying study was normal. At her last OV, carafate and gluten free diet helped significantly. Her omeprazole was dropped to once a day. She denies any worsening symptoms. She feels like her symptoms continue to improve. She does still have some gnawing stomach pain after eating. She takes CBD oil and it does help. Overall, she feels like things are getting better. CS

## 2024-02-27 ENCOUNTER — OV EP (OUTPATIENT)
Dept: URBAN - NONMETROPOLITAN AREA CLINIC 13 | Facility: CLINIC | Age: 74
End: 2024-02-27

## 2024-03-12 ENCOUNTER — OV EP (OUTPATIENT)
Dept: URBAN - NONMETROPOLITAN AREA CLINIC 13 | Facility: CLINIC | Age: 74
End: 2024-03-12
Payer: MEDICARE

## 2024-03-12 VITALS
DIASTOLIC BLOOD PRESSURE: 82 MMHG | HEIGHT: 67 IN | BODY MASS INDEX: 19.93 KG/M2 | HEART RATE: 92 BPM | WEIGHT: 127 LBS | SYSTOLIC BLOOD PRESSURE: 165 MMHG | TEMPERATURE: 98 F

## 2024-03-12 DIAGNOSIS — R10.11 RUQ PAIN: ICD-10-CM

## 2024-03-12 DIAGNOSIS — R11.0 CHRONIC NAUSEA: ICD-10-CM

## 2024-03-12 DIAGNOSIS — R10.13 EPIGASTRIC ABDOMINAL PAIN: ICD-10-CM

## 2024-03-12 PROCEDURE — 99214 OFFICE O/P EST MOD 30 MIN: CPT | Performed by: NURSE PRACTITIONER

## 2024-03-12 RX ORDER — SUCRALFATE 1 G/1
1 TABLET ON AN EMPTY STOMACH TABLET ORAL
Qty: 180 | Refills: 3 | OUTPATIENT

## 2024-03-12 RX ORDER — CEFDINIR 300 MG/1
CAPSULE ORAL
Qty: 14 CAPSULE | Status: ACTIVE | COMMUNITY

## 2024-03-12 RX ORDER — HYDROCODONE BITARTRATE AND ACETAMINOPHEN 10; 325 MG/1; MG/1
TAKE 1 TABLET BY MOUTH FIVE TIME A DAY TABLET ORAL
Qty: 140 EACH | Refills: 0 | Status: ACTIVE | COMMUNITY

## 2024-03-12 RX ORDER — CYANOCOBALAMIN 1000 UG/ML
INJECTION INTRAMUSCULAR; SUBCUTANEOUS
Qty: 3 MILLILITER | Status: ACTIVE | COMMUNITY

## 2024-03-12 RX ORDER — TRAZODONE HYDROCHLORIDE 50 MG/1
TAKE 1 1/2 TABLETS (ONE AND A HALF) BY MOUTH AT BEDTIME TABLET ORAL
Qty: 42 EACH | Refills: 0 | Status: ACTIVE | COMMUNITY

## 2024-03-12 RX ORDER — OMEPRAZOLE 40 MG/1
1 CAPSULE 30 MINUTES BEFORE MORNING MEAL CAPSULE, DELAYED RELEASE ORAL ONCE A DAY
Qty: 90 | Refills: 1 | Status: ACTIVE | COMMUNITY

## 2024-03-12 RX ORDER — BUTALBITAL, ACETAMINOPHEN AND CAFFEINE 50; 325; 40 MG/1; MG/1; MG/1
TAKE 1 TABLET BY MOUTH TWICE A DAY TABLET ORAL
Qty: 56 EACH | Refills: 0 | Status: ACTIVE | COMMUNITY

## 2024-03-12 RX ORDER — OMEPRAZOLE 40 MG/1
TAKE 1 CAPSULE 30 MINUTES BEFORE MORNING MEAL AND SUPPER MEAL ORAL TWICE DAILY 30 DAYS CAPSULE, DELAYED RELEASE ORAL
Qty: 60 CAPSULE | Refills: 3 | Status: ACTIVE | COMMUNITY

## 2024-03-12 RX ORDER — BUSPIRONE HYDROCHLORIDE 5 MG/1
TAKE 1 TABLET BY MOUTH THREE TIMES A DAY IN THE MORNING IN THE EVENING AND BEFORE BEDTIME TABLET ORAL
Qty: 270 EACH | Refills: 0 | Status: ACTIVE | COMMUNITY

## 2024-03-12 RX ORDER — LISINOPRIL 40 MG/1
TAKE 1 TABLET BY MOUTH EVERY DAY TABLET ORAL
Qty: 90 EACH | Refills: 1 | Status: ACTIVE | COMMUNITY

## 2024-03-12 RX ORDER — CYCLOBENZAPRINE HYDROCHLORIDE 10 MG/1
TABLET, FILM COATED ORAL
Qty: 84 TABLET | Status: ACTIVE | COMMUNITY

## 2024-03-12 RX ORDER — FUROSEMIDE 20 MG/1
TAKE 1 TABLET BY MOUTH TWICE A DAY TABLET ORAL
Qty: 180 EACH | Refills: 1 | Status: ACTIVE | COMMUNITY

## 2024-03-12 RX ORDER — FAMOTIDINE 40 MG/1
1 TABLET AT BEDTIME TABLET, FILM COATED ORAL ONCE A DAY
Qty: 30 | Refills: 5 | Status: ACTIVE | COMMUNITY

## 2024-03-12 RX ORDER — SUCRALFATE 1 G/1
1 TABLET ON AN EMPTY STOMACH TABLET ORAL
Qty: 60 | Refills: 3 | Status: ACTIVE | COMMUNITY

## 2024-03-12 RX ORDER — HYOSCYAMINE SULFATE 0.12 MG/1
1 TABLET UNDER THE TONGUE AND ALLOW TO DISSOLVE EVERY 4-6 HRS AS NEEDED FOR ABDOMINAL PAIN/SPASM TABLET, ORALLY DISINTEGRATING ORAL
Qty: 90 | Refills: 3 | Status: ACTIVE | COMMUNITY

## 2024-03-12 RX ORDER — ALBUTEROL SULFATE 108 UG/1
INHALE 2 PUFFS BY MOUTH EVERY 6 HOURS AS NEEDED FOR WHEEZING OR SHORTNESS OF BREATH INHALANT RESPIRATORY (INHALATION)
Qty: 6.7 GRAM | Refills: 6 | Status: ACTIVE | COMMUNITY

## 2024-03-12 RX ORDER — SUCRALFATE 1 G/1
TAKE 1 TABLET BY MOUTH TWICE A DAY BEFORE LUNCH AND BEFORE BEDTIME FOR 30 DAYS TABLET ORAL
Qty: 60 TABLET | Refills: 3 | Status: ACTIVE | COMMUNITY

## 2024-03-12 RX ORDER — FAMOTIDINE 40 MG/1
TAKE 1 TABLET BY MOUTH EVERY DAY AT BEDTIME FOR 30 DAYS TABLET, FILM COATED ORAL
Qty: 90 TABLET | Refills: 2 | Status: ACTIVE | COMMUNITY

## 2024-03-12 RX ORDER — ONDANSETRON 4 MG/1
1 TABLET ON THE TONGUE AND ALLOW TO DISSOLVE TABLET, ORALLY DISINTEGRATING ORAL ONCE A DAY
Qty: 30 | Status: ACTIVE | COMMUNITY

## 2024-03-12 RX ORDER — ESTRADIOL 0.5 MG/1
TABLET ORAL
Qty: 90 TABLET | Status: ACTIVE | COMMUNITY

## 2024-03-12 RX ORDER — TIZANIDINE 4 MG/1
TABLET ORAL
Qty: 84 TABLET | Status: ACTIVE | COMMUNITY

## 2024-03-12 RX ORDER — OMEPRAZOLE 20 MG/1
1 CAPSULE 30 MINUTES BEFORE MORNING MEAL CAPSULE, DELAYED RELEASE ORAL ONCE A DAY
Qty: 90 | Refills: 1

## 2024-03-12 RX ORDER — FAMOTIDINE 40 MG/1
1 TABLET AT BEDTIME TABLET, FILM COATED ORAL ONCE A DAY
Qty: 90 | Refills: 3 | OUTPATIENT
Start: 2024-03-12

## 2024-03-12 NOTE — HPI-TODAY'S VISIT:
Ms. Freeman presents for follow-up of right upper quadrant pain.  She continues to report improvement in her symptoms.  She did not take Levsin but states that Carafate, along with famotidine and omeprazole make her symptoms tolerable.  She does continue to take hydrocodone 5 times a day for fibromyalgia.  Labs that were drawn at last visit due to patient's report of fatigue were within normal limits.  Maria A denies any nausea or vomiting.  Weight has been stable.  Overall feeling well.  LG.

## 2024-03-12 NOTE — HPI-OTHER HISTORIES
6/27/23: Ms. Maria A Freeman is a 72 year old female who presents today for RUQ pain. Ms. Freeman had RUQ u/s performed on 4/30 that showed no evidence of gallstones but UA was positive for UTI. CT on 5/22/23 showed hepatic steatosis and diverticulosis. Liver function normal per labs 5/16. Patient reported black stool on 5/30. HIDA scan prformed on 6/22 was normal. HP stool negative. She is currently taking omeprazole 40 mg BID and still describes a severe gnawing pain. Pain is worst after eating. Worsened about 6 weeks ago and she knows she "got an ulcer". Also endorses loss of appetite and nausea. Trying to see a surgeon to discuss CCY. Last EGD was several years ago. LG.  EGD 6/30/2023 - chronic gastritis, large amout of retained food in stomach  GES 8/1/2023 - normal gastric emptying   10/10/2023 Ms. Maria A Freeman is a 73-year-old female who presents today for follow-up of right upper quadrant pain. Since her last visit she had an upper endoscopy that showed chronic gastritis and a large amount of retained food in stomach. Gastric emptying study was obtained subsequently with normal results. Today MariaA reports that Carafate helped significantly with her stomach discomfort but she still experiences "aggravating "epigastric pain after she eats gluten. She has been trying to follow a gluten-free diet which she also feels is helping her symptoms. Her stool has returned to brown color and she has not experienced any melena nor black or gray stools since her last visit. She endorses "slight "nausea in the mornings. She is considering seeing a surgeon to discuss CCY but would like to avoid surgery if possible. We discussed weaning her omeprazole from twice daily to once daily dosing and adding famotidine at night which will hopefully help her nausea. I will also prescribe Zofran to be used on an as-needed basis. LG.   12/19/2023: Ms. Maria A Freeman is here for follow-up of right upper quadrant pain. EGD showed chronic gastritis and a large amount of retained food in stomach. Gastric emptying study was normal. At her last OV, carafate and gluten free diet helped significantly. Her omeprazole was dropped to once a day. She denies any worsening symptoms. She feels like her symptoms continue to improve. She does still have some gnawing stomach pain after eating. She takes CBD oil and it does help. Overall, she feels like things are getting better. CS

## 2024-09-03 ENCOUNTER — DASHBOARD ENCOUNTERS (OUTPATIENT)
Age: 74
End: 2024-09-03

## 2024-09-03 ENCOUNTER — OFFICE VISIT (OUTPATIENT)
Dept: URBAN - NONMETROPOLITAN AREA CLINIC 13 | Facility: CLINIC | Age: 74
End: 2024-09-03
Payer: MEDICARE

## 2024-09-03 VITALS
SYSTOLIC BLOOD PRESSURE: 154 MMHG | BODY MASS INDEX: 20.78 KG/M2 | HEIGHT: 67 IN | WEIGHT: 132.4 LBS | HEART RATE: 71 BPM | DIASTOLIC BLOOD PRESSURE: 74 MMHG

## 2024-09-03 DIAGNOSIS — R53.83 FATIGUE, UNSPECIFIED TYPE: ICD-10-CM

## 2024-09-03 DIAGNOSIS — R10.13 EPIGASTRIC ABDOMINAL PAIN: ICD-10-CM

## 2024-09-03 DIAGNOSIS — R11.0 CHRONIC NAUSEA: ICD-10-CM

## 2024-09-03 DIAGNOSIS — R10.11 RUQ PAIN: ICD-10-CM

## 2024-09-03 PROCEDURE — 99214 OFFICE O/P EST MOD 30 MIN: CPT | Performed by: NURSE PRACTITIONER

## 2024-09-03 RX ORDER — SUCRALFATE 1 G/1
1 TABLET ON AN EMPTY STOMACH TABLET ORAL
Qty: 180 | Refills: 3 | Status: ACTIVE | COMMUNITY

## 2024-09-03 RX ORDER — ONDANSETRON 4 MG/1
1 TABLET ON THE TONGUE AND ALLOW TO DISSOLVE TABLET, ORALLY DISINTEGRATING ORAL ONCE A DAY
Qty: 30 | Status: ACTIVE | COMMUNITY

## 2024-09-03 RX ORDER — HYOSCYAMINE SULFATE 0.12 MG/1
1 TABLET UNDER THE TONGUE AND ALLOW TO DISSOLVE EVERY 4-6 HRS AS NEEDED FOR ABDOMINAL PAIN/SPASM TABLET, ORALLY DISINTEGRATING ORAL
Qty: 90 | Refills: 3 | Status: ACTIVE | COMMUNITY

## 2024-09-03 RX ORDER — BUSPIRONE HYDROCHLORIDE 5 MG/1
TAKE 1 TABLET BY MOUTH THREE TIMES A DAY IN THE MORNING IN THE EVENING AND BEFORE BEDTIME TABLET ORAL
Qty: 270 EACH | Refills: 0 | Status: ACTIVE | COMMUNITY

## 2024-09-03 RX ORDER — ESTRADIOL 0.5 MG/1
TABLET ORAL
Qty: 90 TABLET | Status: ACTIVE | COMMUNITY

## 2024-09-03 RX ORDER — FAMOTIDINE 40 MG/1
1 TABLET AT BEDTIME TABLET, FILM COATED ORAL ONCE A DAY
Qty: 90 | Refills: 3 | OUTPATIENT

## 2024-09-03 RX ORDER — OMEPRAZOLE 20 MG/1
1 CAPSULE 30 MINUTES BEFORE MORNING MEAL CAPSULE, DELAYED RELEASE ORAL ONCE A DAY
Qty: 90 | Refills: 1 | Status: ACTIVE | COMMUNITY

## 2024-09-03 RX ORDER — OMEPRAZOLE 40 MG/1
TAKE 1 CAPSULE 30 MINUTES BEFORE MORNING MEAL AND SUPPER MEAL ORAL TWICE DAILY 30 DAYS CAPSULE, DELAYED RELEASE ORAL
Qty: 60 CAPSULE | Refills: 3 | Status: ACTIVE | COMMUNITY

## 2024-09-03 RX ORDER — FAMOTIDINE 40 MG/1
TAKE 1 TABLET BY MOUTH EVERY DAY AT BEDTIME FOR 30 DAYS TABLET, FILM COATED ORAL
Qty: 90 TABLET | Refills: 2 | Status: ACTIVE | COMMUNITY

## 2024-09-03 RX ORDER — SUCRALFATE 1 G/1
1 TABLET ON AN EMPTY STOMACH TABLET ORAL
Qty: 180 | Refills: 3 | OUTPATIENT

## 2024-09-03 RX ORDER — CYCLOBENZAPRINE HYDROCHLORIDE 10 MG/1
TABLET, FILM COATED ORAL
Qty: 84 TABLET | Status: ACTIVE | COMMUNITY

## 2024-09-03 RX ORDER — ALBUTEROL SULFATE 108 UG/1
INHALE 2 PUFFS BY MOUTH EVERY 6 HOURS AS NEEDED FOR WHEEZING OR SHORTNESS OF BREATH INHALANT RESPIRATORY (INHALATION)
Qty: 6.7 GRAM | Refills: 6 | Status: ACTIVE | COMMUNITY

## 2024-09-03 RX ORDER — FAMOTIDINE 40 MG/1
1 TABLET AT BEDTIME TABLET, FILM COATED ORAL ONCE A DAY
Qty: 90 | Refills: 3 | Status: ACTIVE | COMMUNITY
Start: 2024-03-12

## 2024-09-03 RX ORDER — TIZANIDINE 4 MG/1
TABLET ORAL
Qty: 84 TABLET | Status: ACTIVE | COMMUNITY

## 2024-09-03 RX ORDER — CEFDINIR 300 MG/1
CAPSULE ORAL
Qty: 14 CAPSULE | Status: ACTIVE | COMMUNITY

## 2024-09-03 RX ORDER — TRAZODONE HYDROCHLORIDE 50 MG/1
TAKE 1 1/2 TABLETS (ONE AND A HALF) BY MOUTH AT BEDTIME TABLET ORAL
Qty: 42 EACH | Refills: 0 | Status: ACTIVE | COMMUNITY

## 2024-09-03 RX ORDER — FAMOTIDINE 40 MG/1
1 TABLET AT BEDTIME TABLET, FILM COATED ORAL ONCE A DAY
Qty: 30 | Refills: 5 | Status: ACTIVE | COMMUNITY

## 2024-09-03 RX ORDER — HYDROCODONE BITARTRATE AND ACETAMINOPHEN 10; 325 MG/1; MG/1
TAKE 1 TABLET BY MOUTH FIVE TIME A DAY TABLET ORAL
Qty: 140 EACH | Refills: 0 | Status: ACTIVE | COMMUNITY

## 2024-09-03 RX ORDER — BUTALBITAL, ACETAMINOPHEN AND CAFFEINE 50; 325; 40 MG/1; MG/1; MG/1
TAKE 1 TABLET BY MOUTH TWICE A DAY TABLET ORAL
Qty: 56 EACH | Refills: 0 | Status: ACTIVE | COMMUNITY

## 2024-09-03 RX ORDER — OMEPRAZOLE 20 MG/1
1 CAPSULE 30 MINUTES BEFORE MORNING MEAL CAPSULE, DELAYED RELEASE ORAL ONCE A DAY
Qty: 90 | Refills: 1

## 2024-09-03 RX ORDER — CYANOCOBALAMIN 1000 UG/ML
INJECTION INTRAMUSCULAR; SUBCUTANEOUS
Qty: 3 MILLILITER | Status: ACTIVE | COMMUNITY

## 2024-09-03 RX ORDER — FUROSEMIDE 20 MG/1
TAKE 1 TABLET BY MOUTH TWICE A DAY TABLET ORAL
Qty: 180 EACH | Refills: 1 | Status: ACTIVE | COMMUNITY

## 2024-09-03 RX ORDER — LISINOPRIL 40 MG/1
TAKE 1 TABLET BY MOUTH EVERY DAY TABLET ORAL
Qty: 90 EACH | Refills: 1 | Status: ACTIVE | COMMUNITY

## 2024-09-03 RX ORDER — SUCRALFATE 1 G/1
TAKE 1 TABLET BY MOUTH TWICE A DAY BEFORE LUNCH AND BEFORE BEDTIME FOR 30 DAYS TABLET ORAL
Qty: 60 TABLET | Refills: 3 | Status: ACTIVE | COMMUNITY

## 2024-09-03 NOTE — HPI-OTHER HISTORIES
6/27/23: Ms. Maria A Freeman is a 72 year old female who presents today for RUQ pain. Ms. Freeman had RUQ u/s performed on 4/30 that showed no evidence of gallstones but UA was positive for UTI. CT on 5/22/23 showed hepatic steatosis and diverticulosis. Liver function normal per labs 5/16. Patient reported black stool on 5/30. HIDA scan prformed on 6/22 was normal. HP stool negative. She is currently taking omeprazole 40 mg BID and still describes a severe gnawing pain. Pain is worst after eating. Worsened about 6 weeks ago and she knows she "got an ulcer". Also endorses loss of appetite and nausea. Trying to see a surgeon to discuss CCY. Last EGD was several years ago. LG.  EGD 6/30/2023 - chronic gastritis, large amout of retained food in stomach  GES 8/1/2023 - normal gastric emptying   10/10/2023 Ms. Maria A Freeman is a 73-year-old female who presents today for follow-up of right upper quadrant pain. Since her last visit she had an upper endoscopy that showed chronic gastritis and a large amount of retained food in stomach. Gastric emptying study was obtained subsequently with normal results. Today Maria A reports that Carafate helped significantly with her stomach discomfort but she still experiences "aggravating "epigastric pain after she eats gluten. She has been trying to follow a gluten-free diet which she also feels is helping her symptoms. Her stool has returned to brown color and she has not experienced any melena nor black or gray stools since her last visit. She endorses "slight "nausea in the mornings. She is considering seeing a surgeon to discuss CCY but would like to avoid surgery if possible. We discussed weaning her omeprazole from twice daily to once daily dosing and adding famotidine at night which will hopefully help her nausea. I will also prescribe Zofran to be used on an as-needed basis. LG.   12/19/2023: Ms. Maria A Freeman is here for follow-up of right upper quadrant pain. EGD showed chronic gastritis and a large amount of retained food in stomach. Gastric emptying study was normal. At her last OV, carafate and gluten free diet helped significantly. Her omeprazole was dropped to once a day. She denies any worsening symptoms. She feels like her symptoms continue to improve. She does still have some gnawing stomach pain after eating. She takes CBD oil and it does help. Overall, she feels like things are getting better. CS  3/12/2024: Ms. Freeman presents for follow-up of right upper quadrant pain. She continues to report improvement in her symptoms. She did not take Levsin but states that Carafate, along with famotidine and omeprazole make her symptoms tolerable. She does continue to take hydrocodone 5 times a day for fibromyalgia. Labs that were drawn at last visit due to patient's report of fatigue were within normal limits. Maria A denies any nausea or vomiting. Weight has been stable. Overall feeling well. LG.

## 2024-09-03 NOTE — HPI-TODAY'S VISIT:
Ms. Maria A Freeman is a 74-year-old female who presents today for follow-up of right upper quadrant pain.  Today she reports doing very well.  She continues to use omeprazole 20 mg to control her gastritis and takes Carafate on an as-needed basis when she has an upset stomach.  She notes some bloating that is not associated with constipation and occurs very intermittently.  Also does not seem to be associate with any particular foods.  She rarely uses Zofran.  No bleeding.  Weight stable.  Feels well overall.  LG.

## 2025-03-04 ENCOUNTER — ERX REFILL RESPONSE (OUTPATIENT)
Dept: URBAN - NONMETROPOLITAN AREA CLINIC 13 | Facility: CLINIC | Age: 75
End: 2025-03-04

## 2025-03-04 RX ORDER — OMEPRAZOLE 20 MG/1
1 CAPSULE 30 MINUTES BEFORE MORNING MEAL CAPSULE, DELAYED RELEASE ORAL ONCE A DAY
Qty: 90 | Refills: 1 | OUTPATIENT

## 2025-03-04 RX ORDER — OMEPRAZOLE 20 MG/1
TAKE 1 CAPSULE BY MOUTH EVERY DAY 30 MINUTES BEFORE MORNING MEAL CAPSULE, DELAYED RELEASE ORAL
Qty: 90 CAPSULE | Refills: 1 | OUTPATIENT